# Patient Record
Sex: MALE | Race: BLACK OR AFRICAN AMERICAN | NOT HISPANIC OR LATINO | Employment: UNEMPLOYED | URBAN - METROPOLITAN AREA
[De-identification: names, ages, dates, MRNs, and addresses within clinical notes are randomized per-mention and may not be internally consistent; named-entity substitution may affect disease eponyms.]

---

## 2018-01-01 ENCOUNTER — HOSPITAL ENCOUNTER (EMERGENCY)
Facility: HOSPITAL | Age: 53
End: 2018-07-24
Attending: EMERGENCY MEDICINE
Payer: MEDICAID

## 2018-01-01 DIAGNOSIS — I46.9 CARDIOPULMONARY ARREST: Primary | ICD-10-CM

## 2018-01-01 LAB — POCT GLUCOSE: 161 MG/DL (ref 70–110)

## 2018-01-01 PROCEDURE — 63600175 PHARM REV CODE 636 W HCPCS: Performed by: EMERGENCY MEDICINE

## 2018-01-01 PROCEDURE — 63600175 PHARM REV CODE 636 W HCPCS

## 2018-01-01 PROCEDURE — 99291 CRITICAL CARE FIRST HOUR: CPT | Mod: 25

## 2018-01-01 PROCEDURE — 27100108

## 2018-01-01 PROCEDURE — 96375 TX/PRO/DX INJ NEW DRUG ADDON: CPT | Mod: 59

## 2018-01-01 PROCEDURE — 96374 THER/PROPH/DIAG INJ IV PUSH: CPT

## 2018-01-01 PROCEDURE — 25000003 PHARM REV CODE 250: Performed by: EMERGENCY MEDICINE

## 2018-01-01 PROCEDURE — 25000003 PHARM REV CODE 250

## 2018-01-01 PROCEDURE — 82962 GLUCOSE BLOOD TEST: CPT

## 2018-01-01 RX ORDER — EPINEPHRINE 0.1 MG/ML
INJECTION INTRAVENOUS CODE/TRAUMA/SEDATION MEDICATION
Status: COMPLETED | OUTPATIENT
Start: 2018-01-01 | End: 2018-01-01

## 2018-01-01 RX ORDER — SODIUM BICARBONATE 1 MEQ/ML
SYRINGE (ML) INTRAVENOUS CODE/TRAUMA/SEDATION MEDICATION
Status: COMPLETED | OUTPATIENT
Start: 2018-01-01 | End: 2018-01-01

## 2018-01-01 RX ADMIN — EPINEPHRINE 0.1 MG: 0.1 INJECTION, SOLUTION ENDOTRACHEAL; INTRACARDIAC; INTRAVENOUS at 06:07

## 2018-01-01 RX ADMIN — SODIUM BICARBONATE 50 MEQ: 84 INJECTION, SOLUTION INTRAVENOUS at 06:07

## 2018-07-24 PROBLEM — I46.9 CARDIOPULMONARY ARREST: Status: ACTIVE | Noted: 2018-01-01

## 2018-07-24 NOTE — ED PROVIDER NOTES
SCRIBE #1 NOTE: I, Ayana Jorge, am scribing for, and in the presence of, Alex Suero Jr., MD. I have scribed the entire note.      History      Chief Complaint   Patient presents with    Cardiac Arrest       Review of patient's allergies indicates:  No Known Allergies     HPI   HPI    7/24/2018, 6:16 PM   History obtained from EMS and wife  HPI limited secondary to cardiac arrest      History of Present Illness: Nate Fonseca is a 52 y.o. male patient who presents to the Emergency Department for a witnessed cardiac arrest. Wife reports her  was with the children when he fell. She states he was not c/o CP or SOB. He has a PMHx of HTN, CHF, and cardiac catheterization with no stents. EMS was dispatched at 1722. He was shocked x3, given x6 epi, and 450mg amiodarone total. Calos tube in place upon arrive. Pt being bagged and chest compressions in process upon arrival. EMS reports there was a lot of vomit in pt's mouth and esophagus.       Arrival mode: EMS    PCP: Primary Doctor No       Past Medical History:  Past Medical History:   Diagnosis Date    CHF (congestive heart failure)     Hypertension      Past Surgical History:  History reviewed. No pertinent surgical history.    Past Family History:  History reviewed. No pertinent family history.    Social History:  Social History     Social History Main Topics    Smoking status: Never Smoker    Smokeless tobacco: unknown    Alcohol use No    Drug use: No    Sexual activity: unknown       ROS   Review of Systems   Unable to perform ROS: Patient unresponsive     Physical Exam      Initial Vitals   BP Pulse Resp Temp SpO2   -- -- -- -- --      MAP       --          Physical Exam  Nursing Notes and Vital Signs Reviewed.  Physical exam is limited due to the patient's condition.   General: Patient is unresponsive to painful and verbal stimuli.    Head: Atraumatic   Eyes: Pupils fixed and dilated   ENT: Airway patent. Calos tube in place.  Cardiovascular:  Heart sounds are absent. Pulses are absent.  Respiratory: No spontaneous respirations. Equal breath sounds with controlled ventilation   Abdominal: No distension   Musculoskeletal: No deformities   Skin: Pale   Neurological: GCS 3. Full neuro exam limited due to patient's condition    ED Course    Critical Care  Date/Time: 7/24/2018 7:25 PM  Performed by: DARRON ASTUDILLO JR  Authorized by: DARRON ASTUDILLO JR   Direct patient critical care time: 15 minutes  Additional history critical care time: 5 minutes  Documentation critical care time: 5 minutes  Consult with family critical care time: 10 minutes  Total critical care time (exclusive of procedural time) : 35 minutes  Critical care time was exclusive of separately billable procedures and treating other patients.  Critical care was necessary to treat or prevent imminent or life-threatening deterioration of the following conditions: Cardiac Arrest.  Critical care was time spent personally by me on the following activities: development of treatment plan with patient or surrogate, interpretation of cardiac output measurements, evaluation of patient's response to treatment, examination of patient, obtaining history from patient or surrogate, ordering and performing treatments and interventions, pulse oximetry, re-evaluation of patient's condition and review of old charts.        ED Vital Signs:  There were no vitals filed for this visit.    Abnormal Lab Results:  Labs Reviewed   POCT GLUCOSE - Abnormal; Notable for the following:        Result Value    POCT Glucose 161 (*)     All other components within normal limits        All Lab Results:  Results for orders placed or performed during the hospital encounter of 07/24/18   POCT glucose   Result Value Ref Range    POCT Glucose 161 (H) 70 - 110 mg/dL              The Emergency Provider reviewed the vital signs and test results, which are outlined above.    ED Discussion     6:16 PM: Pt arrived in ED at this time.     6:21  PM: 1x epi pushed.    6:23 PM: Pulse check. Unable to doppler pulse. No palpable pulse. Chest compressions resumed.    6:24 PM: 1x epi pushed.    6:25 PM: Pulse check. No cardiac activity on bedside echo. No palpable pulse. Chest compressions resumed.     6:28 PM: Pulse check. No palpable pulse. No cardiac activity on bedside echo. Chest compressions resumed. 1 amp of bicarb pushed. 1x epi pushed.    6:29 PM: 1 amp of bicarb pushed.    6:30 PM: Pulse check. Unable to doppler pulse. No cardiac activity on bedside echo. No palpable pulse.    6:31 PM: TOD called.     6:32 PM: Pt's wife notified.     ED Medication(s):  Medications   EPINEPHrine 0.1 mg/mL injection (0.1 mg Intravenous Given 18)   sodium bicarbonate 8.4 % (1 mEq/mL) injection (50 mEq Intravenous Given 18)           Medical Decision Making              Scribe Attestation:   Scribe #1: I performed the above scribed service and the documentation accurately describes the services I performed. I attest to the accuracy of the note.    Attending:   Physician Attestation Statement for Scribe #1: I, Alex Suero Jr., MD, personally performed the services described in this documentation, as scribed by Ayana Jorge, in my presence, and it is both accurate and complete.          Clinical Impression       ICD-10-CM ICD-9-CM   1. Cardiopulmonary arrest I46.9 427.5       Disposition:   Disposition:   Condition: Critical         Alex Suero Jr., MD  18

## 2018-07-24 NOTE — PROGRESS NOTES
Patient arrived via EMS with CPR & manual ventilation ongoing. Calos tube in place by EMS in the field.